# Patient Record
(demographics unavailable — no encounter records)

---

## 2025-04-11 NOTE — PLAN
Script sent   [TextEntry] : Recent sx's suggest current tx plan not sufficient. Will try increase to 54 mg; will require close monitoring of wt/ht (office recheck in 6-8 weeks). Disc use of short acting methylphenidate 10 mg prn on weekends for sports

## 2025-04-11 NOTE — HISTORY OF PRESENT ILLNESS
[de-identified] : med check [FreeTextEntry6] :  Pt with h/o ADHD  med: generic Concerta 36 mg qd (school days).   Does tolerate med; some anorexia. Recently Mom and teachers have noticed return of impulsivity; grades 3rd quarter have slipped.   Pt not seeing therapist currently

## 2025-07-09 NOTE — DISCUSSION/SUMMARY
[FreeTextEntry1] : Anticipatory guidance and parent education given.  Ear drops are usually prescribed to reduce pain and swelling caused by external otitis. It is important to apply the ear drops correctly so that they reach the ear canal: -Lie on patient side or tilt head towards the opposite shoulder. -Place the ear drops in the ear canal. -Lie on side for 20 minutes or place a cotton ball in the ear canal for 20 minutes. During treatment, avoid getting the inside of ears wet. While bathing, place a cotton ball coated with petroleum jelly in the ear. Do not swim for 7 to 10 days after starting treatment. Avoid wearing hearing aids and in-ear headphones until pain improves.

## 2025-07-09 NOTE — HISTORY OF PRESENT ILLNESS
[de-identified] : ear pain [FreeTextEntry6] : BIB mother for R ear pain x3-4 days, worse when touching. No medicine taken today. No SOB, difficulty breathing, chest pain, cough, congestion or URI sx. No n/v/d. No headache, abdominal pain, sore throat or rash. No body aches or fatigue. Good po/uop/bm. Normal sleep and activity.

## 2025-07-09 NOTE — PHYSICAL EXAM
[Pain with manipulation of pinna] : pain with manipulation of pinna [Inflammation of canal] : inflammation of canal [Erythema of canal] : erythema of canal [Right] : (right) [Clear] : right tympanic membrane clear [NL] : warm, clear